# Patient Record
Sex: MALE
[De-identification: names, ages, dates, MRNs, and addresses within clinical notes are randomized per-mention and may not be internally consistent; named-entity substitution may affect disease eponyms.]

---

## 2020-11-06 PROBLEM — Z00.00 ENCOUNTER FOR PREVENTIVE HEALTH EXAMINATION: Status: ACTIVE | Noted: 2020-11-06

## 2020-11-11 ENCOUNTER — NON-APPOINTMENT (OUTPATIENT)
Age: 50
End: 2020-11-11

## 2020-11-11 ENCOUNTER — APPOINTMENT (OUTPATIENT)
Dept: CARDIOLOGY | Facility: CLINIC | Age: 50
End: 2020-11-11
Payer: COMMERCIAL

## 2020-11-11 VITALS
DIASTOLIC BLOOD PRESSURE: 70 MMHG | WEIGHT: 200 LBS | TEMPERATURE: 97.7 F | HEART RATE: 65 BPM | SYSTOLIC BLOOD PRESSURE: 110 MMHG | HEIGHT: 70 IN | BODY MASS INDEX: 28.63 KG/M2 | RESPIRATION RATE: 16 BRPM | OXYGEN SATURATION: 98 %

## 2020-11-11 DIAGNOSIS — R07.89 OTHER CHEST PAIN: ICD-10-CM

## 2020-11-11 DIAGNOSIS — Z78.9 OTHER SPECIFIED HEALTH STATUS: ICD-10-CM

## 2020-11-11 DIAGNOSIS — M79.602 PAIN IN LEFT ARM: ICD-10-CM

## 2020-11-11 DIAGNOSIS — G44.229 CHRONIC TENSION-TYPE HEADACHE, NOT INTRACTABLE: ICD-10-CM

## 2020-11-11 DIAGNOSIS — Z84.2 FAMILY HISTORY OF OTHER DISEASES OF THE GENITOURINARY SYSTEM: ICD-10-CM

## 2020-11-11 DIAGNOSIS — Z82.49 FAMILY HISTORY OF ISCHEMIC HEART DISEASE AND OTHER DISEASES OF THE CIRCULATORY SYSTEM: ICD-10-CM

## 2020-11-11 DIAGNOSIS — Z72.3 LACK OF PHYSICAL EXERCISE: ICD-10-CM

## 2020-11-11 DIAGNOSIS — I34.0 NONRHEUMATIC MITRAL (VALVE) INSUFFICIENCY: ICD-10-CM

## 2020-11-11 DIAGNOSIS — M25.512 PAIN IN LEFT SHOULDER: ICD-10-CM

## 2020-11-11 DIAGNOSIS — Z83.3 FAMILY HISTORY OF DIABETES MELLITUS: ICD-10-CM

## 2020-11-11 PROCEDURE — 99072 ADDL SUPL MATRL&STAF TM PHE: CPT

## 2020-11-11 PROCEDURE — 93000 ELECTROCARDIOGRAM COMPLETE: CPT

## 2020-11-11 PROCEDURE — 99244 OFF/OP CNSLTJ NEW/EST MOD 40: CPT

## 2020-11-11 RX ORDER — ASPIRIN ENTERIC COATED TABLETS 81 MG 81 MG/1
81 TABLET, DELAYED RELEASE ORAL DAILY
Qty: 90 | Refills: 3 | Status: ACTIVE | COMMUNITY
Start: 2020-11-11

## 2020-11-11 NOTE — ASSESSMENT
[FreeTextEntry1] : ECG- SR at 65 with RVCD no significant ST or T wave changes\par \par Lab data \par      10/26/20\par Chol. 219\par Tri.   150\par \par HDL 39\par Creat. 0.94\par HGB 15.5\par \par \par Echo 5/30/13\par LFEV 60-65%\par Moderate mitral regurgitation. \par Mild tricuspid regurgitation \par Mild to Mod. pulmonic regurgitation \par \par Impression\par 50 year old male with a 7 year lapse in care returning for cardiac eval. for concerns of left sides chest pressure and left arm pain with radiation to his shoulder. There is a similair hx of discomfort with atypical chest pain.A  worked up in 2013 noninvasive work up revealed mod. MR  otherwise no structural abnormalities and good LVEF.\par \par 1. + CAD in his father.\par 2. Elevated lipid panel with an LDL of 150 and not being treated.\par 3. Denies any SOB, palps or edema. Exertional discomfort is only mild chest pressure. \par 4.  ECG today WNL\par  \par \par Plan\par 1. Schedule EST with risk factors of HLD , chest pressure and + fam. hx of CAD.\par 2. Start enteric coated Aspirin 81 mg daily.\par 3. Start Rosuvastatin 10 mg 1 tab daily for lipid management. BW to recheck lipids and LFTs to be collected in 3     months. Educated on potential side effects. \par 4. Continue with exercise.  Needs to avoid carbs, starches and sugars, Knows to incorporate more lean proteins     and leafy greens.\par 5. Repeat echo to reassess mitral regurgitation. \par \par Mr. Shah knows to call with any progression of chest discomfort especially with exertion. \par \par Clinical follow up  after testing is completed.

## 2020-11-11 NOTE — PHYSICAL EXAM
[General Appearance - Well Developed] : well developed [Normal Appearance] : normal appearance [General Appearance - Well Nourished] : well nourished [Normal Conjunctiva] : the conjunctiva exhibited no abnormalities [Eyelids - No Xanthelasma] : the eyelids demonstrated no xanthelasmas [Normal Oral Mucosa] : normal oral mucosa [No Oral Pallor] : no oral pallor [No Oral Cyanosis] : no oral cyanosis [Normal Jugular Venous A Waves Present] : normal jugular venous A waves present [Normal Jugular Venous V Waves Present] : normal jugular venous V waves present [No Jugular Venous Arvizu A Waves] : no jugular venous arvizu A waves [Heart Rate And Rhythm] : heart rate and rhythm were normal [Heart Sounds] : normal S1 and S2 [Murmurs] : no murmurs present [Arterial Pulses Normal] : the arterial pulses were normal [Edema] : no peripheral edema present [Respiration, Rhythm And Depth] : normal respiratory rhythm and effort [Exaggerated Use Of Accessory Muscles For Inspiration] : no accessory muscle use [Chest Palpation] : palpation of the chest revealed no abnormalities [Bowel Sounds] : normal bowel sounds [Abdomen Soft] : soft [Abdomen Mass (___ Cm)] : no abdominal mass palpated [Abdomen Hernia] : no hernia was discovered [Abnormal Walk] : normal gait [Gait - Sufficient For Exercise Testing] : the gait was sufficient for exercise testing [Nail Clubbing] : no clubbing of the fingernails [Cyanosis, Localized] : no localized cyanosis [Petechial Hemorrhages (___cm)] : no petechial hemorrhages [] : no ischemic changes [Skin Color & Pigmentation] : normal skin color and pigmentation [Skin Turgor] : normal skin turgor [No Venous Stasis] : no venous stasis [Skin Lesions] : no skin lesions [No Skin Ulcers] : no skin ulcer [No Xanthoma] : no  xanthoma was observed [Oriented To Time, Place, And Person] : oriented to person, place, and time [Impaired Insight] : insight and judgment were intact [Affect] : the affect was normal

## 2020-11-11 NOTE — REASON FOR VISIT
[FreeTextEntry1] : 50 year old male presenting for evaluation of left arm pain with radiation to his left shoulder.  He was last seen in 2013.\par \par Left shoulder pain starting weeks ago prompted his return. Initially he thought it was musculoskeletal but discomfort has continued.\par \par There is a hx of atypical chest pain with similar discomfort which was working up in 2013.\par The pain no is unprovoked. He exercises using the treadmill, resistance and cardio 2-3 days a week. \par He does describes a mild chest pressure with exertion. \par \par Echo from 5/30/13 revealed normal LVEF and mod. MR\par \par Blood work for review shows an elevated lipid panel currently not being treated with statin. His diet consists mostly of rice/ starches. He was instructed by his PCP to treat with diet changes.\par \par He works IT. \par \par Denies persona hx of HTN, CAD, DM , edema or palps.

## 2021-01-28 ENCOUNTER — APPOINTMENT (OUTPATIENT)
Dept: CARDIOLOGY | Facility: CLINIC | Age: 51
End: 2021-01-28

## 2021-02-15 ENCOUNTER — APPOINTMENT (OUTPATIENT)
Dept: CARDIOLOGY | Facility: CLINIC | Age: 51
End: 2021-02-15

## 2021-06-04 ENCOUNTER — APPOINTMENT (OUTPATIENT)
Dept: CARDIOLOGY | Facility: CLINIC | Age: 51
End: 2021-06-04
Payer: COMMERCIAL

## 2021-06-04 PROCEDURE — 93306 TTE W/DOPPLER COMPLETE: CPT

## 2021-06-04 PROCEDURE — 99072 ADDL SUPL MATRL&STAF TM PHE: CPT

## 2021-06-17 ENCOUNTER — APPOINTMENT (OUTPATIENT)
Dept: CARDIOLOGY | Facility: CLINIC | Age: 51
End: 2021-06-17

## 2022-12-19 ENCOUNTER — APPOINTMENT (OUTPATIENT)
Dept: CARDIOLOGY | Facility: CLINIC | Age: 52
End: 2022-12-19

## 2022-12-19 ENCOUNTER — NON-APPOINTMENT (OUTPATIENT)
Age: 52
End: 2022-12-19

## 2022-12-19 VITALS
WEIGHT: 197 LBS | DIASTOLIC BLOOD PRESSURE: 74 MMHG | SYSTOLIC BLOOD PRESSURE: 114 MMHG | HEIGHT: 69 IN | BODY MASS INDEX: 29.18 KG/M2 | HEART RATE: 66 BPM

## 2022-12-19 DIAGNOSIS — I38 ENDOCARDITIS, VALVE UNSPECIFIED: ICD-10-CM

## 2022-12-19 DIAGNOSIS — R07.89 OTHER CHEST PAIN: ICD-10-CM

## 2022-12-19 DIAGNOSIS — E78.5 HYPERLIPIDEMIA, UNSPECIFIED: ICD-10-CM

## 2022-12-19 DIAGNOSIS — Z87.891 PERSONAL HISTORY OF NICOTINE DEPENDENCE: ICD-10-CM

## 2022-12-19 PROCEDURE — 93000 ELECTROCARDIOGRAM COMPLETE: CPT

## 2022-12-19 PROCEDURE — 99214 OFFICE O/P EST MOD 30 MIN: CPT | Mod: 25

## 2022-12-19 RX ORDER — ATORVASTATIN CALCIUM 10 MG/1
10 TABLET, FILM COATED ORAL
Refills: 0 | Status: ACTIVE | COMMUNITY

## 2022-12-19 RX ORDER — ROSUVASTATIN CALCIUM 10 MG/1
10 TABLET, FILM COATED ORAL DAILY
Qty: 90 | Refills: 3 | Status: DISCONTINUED | COMMUNITY
Start: 2020-11-11 | End: 2022-12-19

## 2022-12-21 ENCOUNTER — APPOINTMENT (OUTPATIENT)
Dept: CARDIOLOGY | Facility: CLINIC | Age: 52
End: 2022-12-21

## 2022-12-21 PROCEDURE — 93015 CV STRESS TEST SUPVJ I&R: CPT

## 2022-12-25 NOTE — ASSESSMENT
[FreeTextEntry1] : ECG- SR at 66 bpm no significant ST or T wave changes\par \par No recent labs for review, states latest lipid panel was done over a month ago. \par \par Lab data \par      10/26/20\par Chol. 219\par Tri.   150\par \par HDL 39\par Creat. 0.94\par HGB 15.5\par \par Echocardiogram 6/2021:\par Normal LV size, wall thickness and function\par LVEF 55-60%\par Trace MR\par \par \par Echo 5/30/13\par LFEV 60-65%\par Moderate mitral regurgitation. \par Mild tricuspid regurgitation \par Mild to Mod. pulmonic regurgitation \par \par Impression\par 52 year old male here for cardiac follow-up with concerns regarding recurrent left sided chest pain. \par \par 1. No recent stress testing for review. Patient presented with similar chest pain 2 years ago. Echocardiogram showed no wall motion abnormalities, normal EF and no hypertensive heart disease or VHD. Exercise stress testing was recommended 2 years ago but has not been done yet as patient did not schedule this with our office. \par \par 2. Elevated lipid panel. Patient started Atorvastatin this past summer. Recent lipid panel reportedly done in the last months but results are not available to us at this time. \par \par 3. EKG today shows no ischemic changes. Shortness of breath when hiking that remains unchanged in the past year. Unclear if there is any ischemic process involved. Patient able to tolerate aerobic exercises at the gym without symptoms. \par \par 4. Blood pressure within acceptable range. Not on antihypertensives. Patient denies any personal hx of HTN. \par  \par \par Plan\par 1. Schedule Exercise stress testing to identify any ischemia. \par 2. Continue ASA for now. CT Calcium score for better risk stratification.\par 3. Will request most recent lipid panel. Continue Atorvastatin 10 mg for now. Depending on CT calcium score, may need to adjust statin therapy. \par 4. Continue with current exercise regimen. Any exertional cardiac symptoms should be reported immediately.  \par \par Clinical follow up in 3 months or sooner if chest pain recurrs or if he has any new cardiac symptoms.

## 2022-12-25 NOTE — REASON FOR VISIT
[FreeTextEntry1] : SAIDA JIMENEZ is a 52 year-old M presents here for cardiac follow-up after a 2 year lapse in care. \par \par Cardiac hx is relevant for:\par - Atypical chest pain that was worked up in 2013\par - HLD\par Denies any personal history of HTN or DM. \par \par

## 2022-12-25 NOTE — HISTORY OF PRESENT ILLNESS
[FreeTextEntry1] : Patient reports left sided, nonradiating, aching chest pain that occurs when he is at rest, lasting 20-30 minutes and resolves without any intervention. States pain is similar to what he had in the past. Was previously advised to have stress testing in 2020 but was not done. 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 Denies any exertional chest pain, palpitations or dizziness. Does endorse some shortness of breath when hiking, but this is unchanged compared to a year ago. \par Denies any edema, orthopnea or PND. \par

## 2022-12-25 NOTE — REVIEW OF SYSTEMS
[Weight Loss (___ Lbs)] : [unfilled] ~Ulb weight loss [SOB] : shortness of breath [Chest Discomfort] : chest discomfort [Negative] : Heme/Lymph [Lower Ext Edema] : no extremity edema [Orthopnea] : no orthopnea

## 2023-03-27 ENCOUNTER — APPOINTMENT (OUTPATIENT)
Dept: CARDIOLOGY | Facility: CLINIC | Age: 53
End: 2023-03-27